# Patient Record
(demographics unavailable — no encounter records)

---

## 2025-02-07 NOTE — PHYSICAL EXAM
[5___] : adduction 5[unfilled]/5 [2+] : posterior tibialis pulse: 2+ [Left] : left hip with pelvis [AP] : anteroposterior [There are no fractures, subluxations or dislocations. No significant abnormalities are seen] : There are no fractures, subluxations or dislocations. No significant abnormalities are seen [] : no focal motor deficits [de-identified] : Posting on E [FreeTextEntry9] : Superolateral joint space loss with large osteophyte formation noted, head neck thickening, sclerotic bone formation

## 2025-02-07 NOTE — HISTORY OF PRESENT ILLNESS
[de-identified] : Date of Injury/Onset: 2 years Mechanism of injury: NKI Have you been treated for this in the past? Y Have you had surgery for this in the past? N Physical Therapy/ HEP: YES OTC Medicines: NO RX medicines: NONE Heat, Ice, Elevation: HEAT CSI or Gel Injections:  None Other Previous Treatment: THERAGUN, ACCUPUNCTURE Prior Imaging/Studies:  NONE   Pain: At Rest:5 /10 With Activity: 5/10 Quality of symptoms: C/O LEFT GROIN PAIN , C/O LIMPING WITH WALKING   Affecting Sleep: Yes Stiffness upon waking, lasting greater than 30mins: Yes Difficulty with stairs: Yes Difficulty getting in and out of car: Yes Sit to stand stiffness: Yes Affects walking short/long distances? Yes Home/Work/Recreation affected? Yes   Improves with: REST Worse with: SIT TO STAND, PROLONGED WALKING   This is not a Work-Related Injury being treated under Worker's Compensation. This is not an athletic injury occurring associated with an interscholastic or organized sports team.

## 2025-02-07 NOTE — DISCUSSION/SUMMARY
[de-identified] : Diagnosis Hip Osteoarthritis   AFUA SEALS 59 year  M was seen and evaluated in the office today. Following evaluation, and history of the patient's condition at length, the pathology was explained in full to the patient in layman's terms. Patient has Hip Osteoarthritis. Osteoarthritis is a degenerative joint disease where the cartilage in the hip gradually wears away, leading to pain, stiffness, and reduced mobility. Initially, symptoms may be mild, however this is a progressive condition, and the pain is expected to become more severe and persistent. Advanced stages of hip OA can result in significant disability, making daily activities challenging. I discussed with the patient that since this condition is expected to continue to worsen, they will eventually need a total hip replacement in their life time.   In the interim, discussed with patient several different treatment options regarding managing the gradual loss of function associated with knee OA, along with specific risks and benefits. Nonsurgical options including but not limited to Corticosteroid Injection, Meloxicam 15mg, Medrol Dose Pack, along with activity modification such as non-impact exercise and organized physical therapy. The risk of morbidity associated with proposed treatments were discussed.   Furthermore, discussed with AFUA that they could also delay any immediate treatment options and continue to observe and self-care for the discussed problem. Discussed Home Exercise Programs as well as Rest, Ice and elevation. Patient had ample time to ask any questions about todays visit and the diagnosis, and all questions were answered. Patient verbally expressed they understand the discussion today and the plan going forward.  -- At this time, indicated patient for Meloxicam 15mg due to pain and inflammation of the hip.   -Patient was provided prescription for Meloxicam 15mg. There is a moderate risk of morbidity  from use of prescription strength medications. I recommended that the patient follow-up with their medical physician to discuss any significant specific potential issues with long term medication use such as interactions with current medications or with exacerbation of underlying medical comorbidities. The patient voiced their understanding of the risks including but not limited to bleeding, stroke, kidney dysfunction, heart disease.  -- At this time, patient is indicated for physical therapy due to their reduced ROM and weakness. Discussed with patient the benefits of physical therapy due to their current pain and limited function.  -Following discussion of the options the plan at this time is for the patient to go forward with organized therapy. Patient was provided with a Rx for PT at this time. Patient expressed understanding the treatment plan and will begin PT as soon as they can. They will f/u in 6-8 weeks for further evaluation. -- Patient was advised to follow up in 2-3 months to further evaluate, or sooner if symptoms worsen. Patient will consider NOEMY end of 2025. Patient will follow up in future to discuss this in the future.

## 2025-02-07 NOTE — REASON FOR VISIT
[FreeTextEntry2] : left hip pain Double M-Plasty Intermediate Repair Preamble Text (Leave Blank If You Do Not Want): Undermining was performed with blunt dissection.

## 2025-06-13 NOTE — HISTORY OF PRESENT ILLNESS
[de-identified] : LT NOEMY 6/2/25.  Patient denies fevers, Chills, SOB. Patient has been taking asa BID and Meloxicam as directed. Patient has NOT been wearing stockings as directed. Patient reports that PT has been coming to the house. Patient reports use of Tylenol for pain at this time. Ambulating without support, cane only on occasion.  Patient went to acupuncture yesterday.

## 2025-06-13 NOTE — PHYSICAL EXAM
[Left] : left hip [5___] : adduction 5[unfilled]/5 [] : no greater trochanteric tenderness [FreeTextEntry9] : Flex 90* IR 30* Ext 20* ABD 30*

## 2025-06-13 NOTE — DISCUSSION/SUMMARY
[de-identified] : Patient doing well overall. Dressing removed in office today; incision C/D/I.  Patient can begin formal PT. Continue using walker for ambulation at this time. Continue taking ASA to prevent blood clots. Continue using compression stockings to prevent swelling. Discussed importance of non-impact exercise and muscle stretching before and after exercise. Explained the importance of ice and rest.  Pt is doing well and is to continue with treatment plan. Pt was shown exercises and stretches that can help increase  ROM and strength.  Patient will begin OPPT at this time, given Rx for this.  F/u in 4 weeks

## 2025-07-07 NOTE — HISTORY OF PRESENT ILLNESS
[de-identified] : 7/7/25  s/p left NOEMY 6/2/25   still limping, still going to OPPT.  no pain meds.    6/13/25 LT NOEMY 6/2/25.  Patient denies fevers, Chills, SOB. Patient has been taking asa BID and Meloxicam as directed. Patient has NOT been wearing stockings as directed. Patient reports that PT has been coming to the house. Patient reports use of Tylenol for pain at this time. Ambulating without support, cane only on occasion.  Patient went to acupuncture yesterday.

## 2025-07-07 NOTE — ASSESSMENT
[FreeTextEntry1] : Discussed importance of non-impact exercise and muscle stretching before and after exercise. Reviewed x-rays Explained the importance of ice and rest. Stretching exercises shown, Explained the importance of Range of Motion before strength.    At this time after discussion of the options, the patient would benefit from CONTINUED organized Physical Therapy to continue to increase overall functionality. The patient was provided with an updated Rx in office today and was instructed to attend PT for 6-8 weeks in order to increase strength and ROM. Patient agreed with this plan and will continue PT at this time.  DISCUSSED NEED FOR PROPHYLACTIC ABX PRIOR TO ALL DENTAL WORK AND CLEANINGS FOR LIFE   Continue home strengthening and stretching program consisting of non-impact exercises and ice as needed. Return to office 6 weeks

## 2025-07-07 NOTE — PHYSICAL EXAM
[5___] : adduction 5[unfilled]/5 [] : patient ambulates without assistive device [Left] : left hip with pelvis [AP] : anteroposterior [Lateral] : lateral [Components well fixed, in good position] : Components well fixed, in good position [FreeTextEntry9] : I obtained and independently interpreted todays x ray 07/07/2025

## 2025-07-07 NOTE — DISCUSSION/SUMMARY
[de-identified] : Discussed importance of non-impact exercise and muscle stretching before and after exercise. Reviewed x-rays Explained the importance of ice and rest. Stretching exercises shown, Explained the importance of Range of Motion before strength.    At this time after discussion of the options, the patient would benefit from CONTINUED organized Physical Therapy to continue to increase overall functionality. The patient was provided with an updated Rx in office today and was instructed to attend PT for 6-8 weeks in order to increase strength and ROM. Patient agreed with this plan and will continue PT at this time.  DISCUSSED NEED FOR PROPHYLACTIC ABX PRIOR TO ALL DENTAL WORK AND CLEANINGS FOR LIFE   Continue home strengthening and stretching program consisting of non-impact exercises and ice as needed. Return to office 6 weeks

## 2025-07-22 NOTE — HISTORY OF PRESENT ILLNESS
[de-identified] : 7/22/25: s/p left NOEMY 6/2/25. Patient reports that he has been progressing well at this time. Patient feels he has been walking better since last visit. Patient reports that he has been attending PT 2-3x per week. patient reports pain is minimal at this time   7/7/25  s/p left NOEMY 6/2/25   still limping, still going to OPPT.  no pain meds.    6/13/25 LT NOEMY 6/2/25.  Patient denies fevers, Chills, SOB. Patient has been taking asa BID and Meloxicam as directed. Patient has NOT been wearing stockings as directed. Patient reports that PT has been coming to the house. Patient reports use of Tylenol for pain at this time. Ambulating without support, cane only on occasion.  Patient went to acupuncture yesterday.

## 2025-07-22 NOTE — HISTORY OF PRESENT ILLNESS
[de-identified] : 7/22/25: s/p left NOEMY 6/2/25. Patient reports that he has been progressing well at this time. Patient feels he has been walking better since last visit. Patient reports that he has been attending PT 2-3x per week. patient reports pain is minimal at this time   7/7/25  s/p left NOEMY 6/2/25   still limping, still going to OPPT.  no pain meds.    6/13/25 LT NOEMY 6/2/25.  Patient denies fevers, Chills, SOB. Patient has been taking asa BID and Meloxicam as directed. Patient has NOT been wearing stockings as directed. Patient reports that PT has been coming to the house. Patient reports use of Tylenol for pain at this time. Ambulating without support, cane only on occasion.  Patient went to acupuncture yesterday.

## 2025-07-22 NOTE — PHYSICAL EXAM
[2+] : dorsalis pedis pulse: 2+ [5___] : adduction 5[unfilled]/5 [Left] : left hip with pelvis [AP] : anteroposterior [Lateral] : lateral [Components well fixed, in good position] : Components well fixed, in good position [] : no greater trochanteric tenderness [FreeTextEntry8] : Adductor tendon and inferior pubic rami TTP [FreeTextEntry9] : Flex 110* IR 40* Ext 50* ABD 45*  [de-identified] : Patient has a minimally antalgic gait, improved from last visit.

## 2025-07-22 NOTE — DISCUSSION/SUMMARY
[de-identified] : Discussed importance of non-impact exercise and muscle stretching before and after exercise. Reviewed x-rays Explained the importance of ice and rest. Stretching exercises shown, Explained the importance of Range of Motion before strength.  PATIENT IS IMPROVING AT THIS TIME, WALKING BETTER THAN LAST VISIT. PATIENT REPORTS MINIMAL PAIN.     At this time after discussion of the options, the patient would benefit from CONTINUED organized Physical Therapy to continue to increase overall functionality. The patient was provided with an updated Rx in office today and was instructed to attend PT for 6-8 weeks in order to increase strength and ROM. Patient agreed with this plan and will continue PT at this time.  DISCUSSED NEED FOR PROPHYLACTIC ABX PRIOR TO ALL DENTAL WORK AND CLEANINGS FOR LIFE   Continue home strengthening and stretching program consisting of non-impact exercises and ice as needed. Return to office 6 weeks

## 2025-07-22 NOTE — PHYSICAL EXAM
[2+] : dorsalis pedis pulse: 2+ [5___] : adduction 5[unfilled]/5 [Left] : left hip with pelvis [AP] : anteroposterior [Lateral] : lateral [Components well fixed, in good position] : Components well fixed, in good position [] : no greater trochanteric tenderness [FreeTextEntry8] : Adductor tendon and inferior pubic rami TTP [FreeTextEntry9] : Flex 110* IR 40* Ext 50* ABD 45*  [de-identified] : Patient has a minimally antalgic gait, improved from last visit.

## 2025-07-22 NOTE — DISCUSSION/SUMMARY
[de-identified] : Discussed importance of non-impact exercise and muscle stretching before and after exercise. Reviewed x-rays Explained the importance of ice and rest. Stretching exercises shown, Explained the importance of Range of Motion before strength.  PATIENT IS IMPROVING AT THIS TIME, WALKING BETTER THAN LAST VISIT. PATIENT REPORTS MINIMAL PAIN.     At this time after discussion of the options, the patient would benefit from CONTINUED organized Physical Therapy to continue to increase overall functionality. The patient was provided with an updated Rx in office today and was instructed to attend PT for 6-8 weeks in order to increase strength and ROM. Patient agreed with this plan and will continue PT at this time.  DISCUSSED NEED FOR PROPHYLACTIC ABX PRIOR TO ALL DENTAL WORK AND CLEANINGS FOR LIFE   Continue home strengthening and stretching program consisting of non-impact exercises and ice as needed. Return to office 6 weeks